# Patient Record
Sex: FEMALE | Race: WHITE | URBAN - METROPOLITAN AREA
[De-identification: names, ages, dates, MRNs, and addresses within clinical notes are randomized per-mention and may not be internally consistent; named-entity substitution may affect disease eponyms.]

---
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
## 2025-07-21 NOTE — PROGRESS NOTES
Patient:   KAYLYN CLEMENTE            MRN: [id number]            FIN: [id number]               Age:   11 years     Sex:  Female     :  2005   Associated Diagnoses:   None   Author:   Raymond Bergman MD       -   Today's date:    2017.        -   To whom it may concern:        This patient Was seen in my office on  2017.  .  He/ she may return to school.  Please contact me if you have any questions or concerns.      -   Juvencio Bergman MD   As mentioned above, CT scan of the chest dated 6/18/2025 showed a large left pleural effusion with compressive atelectasis of the left lung.  The patient was being treated for an organizing pneumonia.  On 6/18/2025, a chest tube was put in and 1500 cc of pleural fluid was drained.  It was consistent with transudate.  Pleural fluid cultures grew Parviomonas micra.  Previously, the patient has been treated with multiple antibiotics including levofloxacin for approximately 3 weeks.  The patient has been started on metronidazole 500 mg p.o. 3 times daily for 30 days after discussing with me.  I recommend to complete the course.  From my discussion with the patient at least, she is doing clinically well and improving.  If she continues to improve, no further workup may be needed.  I have asked her to follow-up with me in 2 months.